# Patient Record
Sex: MALE | Race: WHITE
[De-identification: names, ages, dates, MRNs, and addresses within clinical notes are randomized per-mention and may not be internally consistent; named-entity substitution may affect disease eponyms.]

---

## 2018-06-26 ENCOUNTER — HOSPITAL ENCOUNTER (EMERGENCY)
Dept: HOSPITAL 11 - JP.ED | Age: 33
Discharge: HOME | End: 2018-06-26
Payer: COMMERCIAL

## 2018-06-26 DIAGNOSIS — S60.451A: ICD-10-CM

## 2018-06-26 DIAGNOSIS — S60.453A: Primary | ICD-10-CM
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
## 2018-06-26 DIAGNOSIS — Z23: ICD-10-CM

## 2018-06-26 NOTE — EDM.PDOC
ED HPI GENERAL MEDICAL PROBLEM





- General


Chief Complaint: Laceration


Stated Complaint: FISH HOOKS STUCK IN LEFT HAND TWO FINGERS


Time Seen by Provider: 06/26/18 19:10


Source of Information: Reports: Patient, RN Notes Reviewed


History Limitations: Reports: No Limitations





- History of Present Illness


INITIAL COMMENTS - FREE TEXT/NARRATIVE: 





Chief complaint fishhook injury





History of present illness


32-year-old male had 2 fingers entailed on double fishhooks about 4 hours ago.


Tetanus status not up-to-date


No other injuries or concerns


Unable to remove the fishhooks on his own.





- Related Data


 Allergies











Allergy/AdvReac Type Severity Reaction Status Date / Time


 


No Known Allergies Allergy   Verified 06/26/18 19:01











Home Meds: 


 Home Meds





NK [No Known Home Meds]  06/26/18 [History]











Past Medical History





- Past Health History


Medical/Surgical History: Denies Medical/Surgical History





Social & Family History





- Tobacco Use


Smoking Status *Q: Never Smoker





- Caffeine Use


Caffeine Use: Reports: Soda





- Recreational Drug Use


Recreational Drug Use: No





ED ROS GENERAL





- Review of Systems


Review Of Systems: ROS reveals no pertinent complaints other than HPI.


Skin: Reports: Other (fishhook stuck in each of the left middle and index 

fingers)





ED EXAM, SKIN/RASH


Exam: See Below


Exam Limited By: No Limitations


General Appearance: Alert, No Apparent Distress, Other (appears healthy, blood 

pressure is elevated but other vital signs normal)


Respiratory/Chest: No Respiratory Distress, No Accessory Muscle Use


Extremities: Other (fishhook stuck in the distal phalanxof both the left long 

finger and left middle finger volar aspect)


Neurological: Alert, Oriented, No Motor/Sensory Deficits


Psychiatric: Normal Mood


Skin: Warm, Normal Color, Other (no signs of infection)





ED SKIN PROCEDURES





- Foreign Body Removal


Consent Obtained:: Patient


Performing Doctor:: Esteban Nichols


Foreign Body Other Location Comment:: tip of the volar aspect of the left 

middle and left index finger


Anesthesia Type: Local (1% lidocaine with epinephrine2 mL total)


Findings:: 





hoax removed withneedle over raul technique without difficulty


Complications:: No


Comments:: 





tolerated well, dressing applied by nurse





Course





- Vital Signs


Last Recorded V/S: 


 Last Vital Signs











Temp  35.9 C   06/26/18 19:06


 


Pulse  85   06/26/18 19:06


 


Resp  18   06/26/18 19:06


 


BP  173/97 H  06/26/18 19:06


 


Pulse Ox  98   06/26/18 19:06














- Orders/Labs/Meds


Orders: 


 Active Orders 24 hr











 Category Date Time Status


 


 Vaccines to be Administered [RC] PER UNIT ROUTINE Care  06/26/18 19:10 Active











Meds: 


Medications














Discontinued Medications














Generic Name Dose Route Start Last Admin





  Trade Name Meaghan  PRN Reason Stop Dose Admin


 


Bacitracin  1 dose  06/26/18 19:26  06/26/18 19:37





  Bacitracin Oint 1 Gm  TOP  06/26/18 19:27  1 dose





  ONETIME ONE   Administration





     





     





     





     


 


Diphtheria/Tetanus/Acell Pertussis  0.5 ml  06/26/18 19:10  06/26/18 19:30





  Adacel  IM  06/26/18 19:11  0.5 ml





  .ONCE ONE   Administration





     





     





     





     


 


Lidocaine/Epinephrine  10 ml  06/26/18 18:56  06/26/18 19:06





  Xylocaine 1% With Epinephrine 1:100,000  INJECT  06/26/18 18:57  10 ml





  ONETIME STA   Administration





     





     





     





     














- Re-Assessments/Exams


Free Text/Narrative Re-Assessment/Exam: 





06/26/18 19:27


42-year-old male with fishhook embedded 2 fingers of his left hand


See procedure note for fishhook removal


Tetanus diphtheria activated pertussis booster 0.5 mL IM


Bacitracin and dressing applied by nurses to the puncture wound sites.








Departure





- Departure


Time of Disposition: 20:12


Disposition: Home, Self-Care 01


Condition: Good


Clinical Impression: 


[place] injury to finger


Qualifiers:


 Encounter type: initial encounter Laterality: left Qualified Code(s): S69.92XA 

- Unspecified injury of left wrist, hand and finger(s), initial encounter








- Discharge Information


Instructions:  Puncture Wound, Easy-to-Read


Referrals: 


PCP,None [Primary Care Provider] - 


Forms:  ED Department Discharge


Additional Instructions: 


Get rechecked if signs of infection occur such as increased pain, increased 

swelling, redness especially red streaks going up the arm, or cloudy or foul 

discharge from the wound.





bathe as usual





- My Orders


Last 24 Hours: 


My Active Orders





06/26/18 19:10


Vaccines to be Administered [RC] PER UNIT ROUTINE 














- Assessment/Plan


Last 24 Hours: 


My Active Orders





06/26/18 19:10


Vaccines to be Administered [RC] PER UNIT ROUTINE